# Patient Record
Sex: MALE | Race: WHITE | NOT HISPANIC OR LATINO | Employment: OTHER | URBAN - NONMETROPOLITAN AREA
[De-identification: names, ages, dates, MRNs, and addresses within clinical notes are randomized per-mention and may not be internally consistent; named-entity substitution may affect disease eponyms.]

---

## 2024-03-11 PROBLEM — R06.02 SOBOE (SHORTNESS OF BREATH ON EXERTION): Status: ACTIVE | Noted: 2024-03-11

## 2024-03-11 PROBLEM — I10 ESSENTIAL HYPERTENSION: Status: ACTIVE | Noted: 2024-03-11

## 2024-03-11 PROBLEM — R60.9 EDEMA: Status: ACTIVE | Noted: 2024-03-11

## 2024-03-11 PROBLEM — Z95.810 ICD (IMPLANTABLE CARDIOVERTER-DEFIBRILLATOR) IN PLACE: Status: ACTIVE | Noted: 2024-03-11

## 2024-03-11 PROBLEM — E11.9 DIABETES MELLITUS (MULTI): Status: ACTIVE | Noted: 2024-03-11

## 2024-03-11 PROBLEM — I42.8 NONISCHEMIC CARDIOMYOPATHY (MULTI): Status: ACTIVE | Noted: 2024-03-11

## 2024-03-11 PROBLEM — I50.22 CHRONIC SYSTOLIC HEART FAILURE, ACC/AHA STAGE C (MULTI): Status: ACTIVE | Noted: 2024-03-11

## 2024-03-11 RX ORDER — FUROSEMIDE 20 MG/1
1 TABLET ORAL DAILY
COMMUNITY

## 2024-03-11 RX ORDER — GLIPIZIDE 2.5 MG/1
2.5 TABLET, EXTENDED RELEASE ORAL DAILY
COMMUNITY

## 2024-03-11 RX ORDER — SPIRONOLACTONE 25 MG/1
1 TABLET ORAL 2 TIMES DAILY
COMMUNITY

## 2024-03-11 RX ORDER — CALCIUM CARBONATE 300MG(750)
400 TABLET,CHEWABLE ORAL DAILY
COMMUNITY

## 2024-03-11 RX ORDER — ALLOPURINOL 100 MG/1
100 TABLET ORAL 2 TIMES DAILY
COMMUNITY

## 2024-03-11 RX ORDER — FERROUS SULFATE 325(65) MG
325 TABLET ORAL
COMMUNITY

## 2024-03-11 RX ORDER — LOSARTAN POTASSIUM 25 MG/1
1 TABLET ORAL DAILY
COMMUNITY

## 2024-03-11 RX ORDER — COLCHICINE 0.6 MG/1
0.6 TABLET ORAL DAILY
COMMUNITY

## 2024-03-11 RX ORDER — CARVEDILOL 6.25 MG/1
1 TABLET ORAL
COMMUNITY

## 2024-03-11 RX ORDER — LINAGLIPTIN 5 MG/1
5 TABLET, FILM COATED ORAL DAILY
COMMUNITY

## 2024-06-28 ENCOUNTER — APPOINTMENT (OUTPATIENT)
Dept: CARDIOLOGY | Facility: CLINIC | Age: 79
End: 2024-06-28
Payer: MEDICARE

## 2024-06-28 VITALS
SYSTOLIC BLOOD PRESSURE: 110 MMHG | DIASTOLIC BLOOD PRESSURE: 70 MMHG | HEIGHT: 66 IN | WEIGHT: 200 LBS | HEART RATE: 62 BPM | BODY MASS INDEX: 32.14 KG/M2

## 2024-06-28 DIAGNOSIS — I42.8 NONISCHEMIC CARDIOMYOPATHY (MULTI): ICD-10-CM

## 2024-06-28 DIAGNOSIS — I10 ESSENTIAL HYPERTENSION: ICD-10-CM

## 2024-06-28 DIAGNOSIS — Z95.810 ICD (IMPLANTABLE CARDIOVERTER-DEFIBRILLATOR) IN PLACE: ICD-10-CM

## 2024-06-28 DIAGNOSIS — I50.22 CHRONIC SYSTOLIC HEART FAILURE, ACC/AHA STAGE C (MULTI): ICD-10-CM

## 2024-06-28 PROCEDURE — 1159F MED LIST DOCD IN RCRD: CPT | Performed by: INTERNAL MEDICINE

## 2024-06-28 PROCEDURE — 99214 OFFICE O/P EST MOD 30 MIN: CPT | Performed by: INTERNAL MEDICINE

## 2024-06-28 PROCEDURE — 1036F TOBACCO NON-USER: CPT | Performed by: INTERNAL MEDICINE

## 2024-06-28 PROCEDURE — 3078F DIAST BP <80 MM HG: CPT | Performed by: INTERNAL MEDICINE

## 2024-06-28 PROCEDURE — 3074F SYST BP LT 130 MM HG: CPT | Performed by: INTERNAL MEDICINE

## 2024-06-28 RX ORDER — PREDNISONE 10 MG/1
10 TABLET ORAL DAILY
COMMUNITY

## 2024-06-28 NOTE — LETTER
"June 28, 2024     Amarjit Pedersen DO  [address]  [facility]  [facility] [id number]    Patient: Jay Alanis Jr.   YOB: 1945   Date of Visit: 6/28/2024       Dear Dr. Amarjit Pedersen DO:    Thank you for referring Jay Alanis to me for evaluation. Below are my notes for this consultation.  If you have questions, please do not hesitate to call me. I look forward to following your patient along with you.       Sincerely,     Qamar Mckee MD      CC: No Recipients  ______________________________________________________________________________________    Subjective   Jay Alanis Jr. is a 79 y.o. male       Chief Complaint    Follow-up          HPI     Review of Systems   All other systems reviewed and are negative.     Patient returns in follow-up of problems as noted.  He is done well.  His symptoms are functional class I to functional class II and overall he feels well.  He denies any complaints.  He has no orthopnea PND or dyspnea at night.  Blood pressure is adequately controlled and he is on appropriate guideline directed therapy.  Defibrillator checks demonstrate satisfactory device performance and no underlying arrhythmias necessitating adjustments in therapy.  As before we did advocate the merits of diet and weight loss.      Vitals:    06/28/24 1428   BP: 110/70   BP Location: Left arm   Patient Position: Sitting   Pulse: 62   Weight: 90.7 kg (200 lb)   Height: 1.676 m (5' 6\")        Objective   Physical Exam  Constitutional:       Appearance: Normal appearance.   HENT:      Nose: Nose normal.   Neck:      Vascular: No carotid bruit.   Cardiovascular:      Rate and Rhythm: Normal rate.      Pulses: Normal pulses.      Heart sounds: Normal heart sounds.   Pulmonary:      Effort: Pulmonary effort is normal.   Abdominal:      General: Bowel sounds are normal.      Palpations: Abdomen is soft.   Musculoskeletal:         General: Normal range of motion.      " Cervical back: Normal range of motion.      Right lower leg: No edema.      Left lower leg: No edema.   Skin:     General: Skin is warm and dry.   Neurological:      General: No focal deficit present.      Mental Status: He is alert.   Psychiatric:         Mood and Affect: Mood normal.         Behavior: Behavior normal.         Thought Content: Thought content normal.         Judgment: Judgment normal.         Allergies  Sulfa (sulfonamide antibiotics)     Current Medications    Current Outpatient Medications:   •  allopurinol (Zyloprim) 100 mg tablet, Take 1 tablet (100 mg) by mouth 2 times a day., Disp: , Rfl:   •  carvedilol (Coreg) 6.25 mg tablet, Take 1 tablet (6.25 mg) by mouth 2 times daily (morning and late afternoon)., Disp: , Rfl:   •  ferrous sulfate, 325 mg ferrous sulfate, tablet, Take 1 tablet by mouth once daily with breakfast., Disp: , Rfl:   •  furosemide (Lasix) 20 mg tablet, Take 1 tablet (20 mg) by mouth 2 times daily (morning and late afternoon)., Disp: , Rfl:   •  glipiZIDE XL (Glucotrol XL) 2.5 mg 24 hr tablet, Take 1 tablet (2.5 mg) by mouth once daily. Do not crush, chew, or split., Disp: , Rfl:   •  linaGLIPtin (Tradjenta) 5 mg tablet, Take 1 tablet (5 mg) by mouth once daily., Disp: , Rfl:   •  losartan (Cozaar) 25 mg tablet, Take 1 tablet (25 mg) by mouth once daily., Disp: , Rfl:   •  magnesium oxide (Mag-Ox) 400 mg tablet, Take 1 tablet (400 mg) by mouth once daily., Disp: , Rfl:   •  predniSONE (Deltasone) 10 mg tablet, Take 1 tablet (10 mg) by mouth once daily., Disp: , Rfl:   •  sodium zirconium cyclosilicate (Lokelma) 10 gram packet, Take by mouth., Disp: , Rfl:   •  spironolactone (Aldactone) 25 mg tablet, Take 1 tablet (25 mg) by mouth 2 times a day., Disp: , Rfl:                  Pacemaker/Defibrillator follow up per routine     Assessment/Plan   1. Nonischemic cardiomyopathy (Multi)  Nonischemic in nature.  Coronary angiogram several years ago unremarkable.  Improved with  guideline directed therapy    2. ICD (implantable cardioverter-defibrillator) in place  To mitigate risk of sudden cardiac death due to cardiomyopathy and malignant ventricular arrhythmia    3. Chronic systolic heart failure, ACC/AHA stage C (Multi)  Now functional class I-II    4. Essential hypertension  Adequately controlled based upon review    5. BMI 32.0-32.9,adult  The merits of weight loss were advocated    Scribe Attestation  By signing my name below, IKlarissa LPN, Scribdarci   attest that this documentation has been prepared under the direction and in the presence of Qamar Mckee MD.     Provider Attestation - Scribe documentation    All medical record entries made by the Scribe were at my direction and personally dictated by me. I have reviewed the chart and agree that the record accurately reflects my personal performance of the history, physical exam, discussion and plan.

## 2024-06-28 NOTE — PROGRESS NOTES
"Subjective   Jay Alanis Jr. is a 79 y.o. male       Chief Complaint    Follow-up          HPI     Review of Systems   All other systems reviewed and are negative.     Patient returns in follow-up of problems as noted.  He is done well.  His symptoms are functional class I to functional class II and overall he feels well.  He denies any complaints.  He has no orthopnea PND or dyspnea at night.  Blood pressure is adequately controlled and he is on appropriate guideline directed therapy.  Defibrillator checks demonstrate satisfactory device performance and no underlying arrhythmias necessitating adjustments in therapy.  As before we did advocate the merits of diet and weight loss.      Vitals:    06/28/24 1428   BP: 110/70   BP Location: Left arm   Patient Position: Sitting   Pulse: 62   Weight: 90.7 kg (200 lb)   Height: 1.676 m (5' 6\")        Objective   Physical Exam  Constitutional:       Appearance: Normal appearance.   HENT:      Nose: Nose normal.   Neck:      Vascular: No carotid bruit.   Cardiovascular:      Rate and Rhythm: Normal rate.      Pulses: Normal pulses.      Heart sounds: Normal heart sounds.   Pulmonary:      Effort: Pulmonary effort is normal.   Abdominal:      General: Bowel sounds are normal.      Palpations: Abdomen is soft.   Musculoskeletal:         General: Normal range of motion.      Cervical back: Normal range of motion.      Right lower leg: No edema.      Left lower leg: No edema.   Skin:     General: Skin is warm and dry.   Neurological:      General: No focal deficit present.      Mental Status: He is alert.   Psychiatric:         Mood and Affect: Mood normal.         Behavior: Behavior normal.         Thought Content: Thought content normal.         Judgment: Judgment normal.         Allergies  Sulfa (sulfonamide antibiotics)     Current Medications    Current Outpatient Medications:     allopurinol (Zyloprim) 100 mg tablet, Take 1 tablet (100 mg) by mouth 2 times a day., Disp: " , Rfl:     carvedilol (Coreg) 6.25 mg tablet, Take 1 tablet (6.25 mg) by mouth 2 times daily (morning and late afternoon)., Disp: , Rfl:     ferrous sulfate, 325 mg ferrous sulfate, tablet, Take 1 tablet by mouth once daily with breakfast., Disp: , Rfl:     furosemide (Lasix) 20 mg tablet, Take 1 tablet (20 mg) by mouth 2 times daily (morning and late afternoon)., Disp: , Rfl:     glipiZIDE XL (Glucotrol XL) 2.5 mg 24 hr tablet, Take 1 tablet (2.5 mg) by mouth once daily. Do not crush, chew, or split., Disp: , Rfl:     linaGLIPtin (Tradjenta) 5 mg tablet, Take 1 tablet (5 mg) by mouth once daily., Disp: , Rfl:     losartan (Cozaar) 25 mg tablet, Take 1 tablet (25 mg) by mouth once daily., Disp: , Rfl:     magnesium oxide (Mag-Ox) 400 mg tablet, Take 1 tablet (400 mg) by mouth once daily., Disp: , Rfl:     predniSONE (Deltasone) 10 mg tablet, Take 1 tablet (10 mg) by mouth once daily., Disp: , Rfl:     sodium zirconium cyclosilicate (Lokelma) 10 gram packet, Take by mouth., Disp: , Rfl:     spironolactone (Aldactone) 25 mg tablet, Take 1 tablet (25 mg) by mouth 2 times a day., Disp: , Rfl:                  Pacemaker/Defibrillator follow up per routine     Assessment/Plan   1. Nonischemic cardiomyopathy (Multi)  Nonischemic in nature.  Coronary angiogram several years ago unremarkable.  Improved with guideline directed therapy    2. ICD (implantable cardioverter-defibrillator) in place  To mitigate risk of sudden cardiac death due to cardiomyopathy and malignant ventricular arrhythmia    3. Chronic systolic heart failure, ACC/AHA stage C (Multi)  Now functional class I-II    4. Essential hypertension  Adequately controlled based upon review    5. BMI 32.0-32.9,adult  The merits of weight loss were advocated    Scribe Attestation  By signing my name below, IKlarissa LPN   , Scribe   attest that this documentation has been prepared under the direction and in the presence of Qamar Mckee MD.     Provider  Attestation - Scribe documentation    All medical record entries made by the Scribe were at my direction and personally dictated by me. I have reviewed the chart and agree that the record accurately reflects my personal performance of the history, physical exam, discussion and plan.

## 2024-07-05 PROCEDURE — 93295 DEV INTERROG REMOTE 1/2/MLT: CPT | Performed by: INTERNAL MEDICINE

## 2024-08-05 DIAGNOSIS — I10 ESSENTIAL HYPERTENSION: ICD-10-CM

## 2024-08-05 DIAGNOSIS — R60.0 LOCALIZED EDEMA: ICD-10-CM

## 2024-08-05 DIAGNOSIS — I50.22 CHRONIC SYSTOLIC HEART FAILURE, ACC/AHA STAGE C (MULTI): ICD-10-CM

## 2024-08-05 DIAGNOSIS — I42.8 NONISCHEMIC CARDIOMYOPATHY (MULTI): ICD-10-CM

## 2024-08-07 RX ORDER — SPIRONOLACTONE 25 MG/1
25 TABLET ORAL 2 TIMES DAILY
Qty: 180 TABLET | Refills: 3 | Status: SHIPPED | OUTPATIENT
Start: 2024-08-07

## 2024-08-07 RX ORDER — CARVEDILOL 6.25 MG/1
6.25 TABLET ORAL
Qty: 180 TABLET | Refills: 3 | Status: SHIPPED | OUTPATIENT
Start: 2024-08-07

## 2024-08-07 RX ORDER — FUROSEMIDE 20 MG/1
20 TABLET ORAL 2 TIMES DAILY
Qty: 180 TABLET | Refills: 3 | Status: SHIPPED | OUTPATIENT
Start: 2024-08-07

## 2024-08-07 RX ORDER — LOSARTAN POTASSIUM 25 MG/1
25 TABLET ORAL DAILY
Qty: 90 TABLET | Refills: 3 | Status: SHIPPED | OUTPATIENT
Start: 2024-08-07

## 2024-09-16 ENCOUNTER — TELEPHONE (OUTPATIENT)
Dept: CARDIOLOGY | Facility: CLINIC | Age: 79
End: 2024-09-16
Payer: MEDICARE

## 2024-09-16 DIAGNOSIS — Z95.810 ICD (IMPLANTABLE CARDIOVERTER-DEFIBRILLATOR) IN PLACE: ICD-10-CM

## 2024-09-16 DIAGNOSIS — I42.8 NONISCHEMIC CARDIOMYOPATHY (MULTI): ICD-10-CM

## 2025-06-16 DIAGNOSIS — I50.22 CHRONIC SYSTOLIC HEART FAILURE, ACC/AHA STAGE C: ICD-10-CM

## 2025-06-16 DIAGNOSIS — I42.8 NONISCHEMIC CARDIOMYOPATHY (MULTI): ICD-10-CM

## 2025-06-16 DIAGNOSIS — I10 ESSENTIAL HYPERTENSION: ICD-10-CM

## 2025-06-16 DIAGNOSIS — R60.0 LOCALIZED EDEMA: ICD-10-CM

## 2025-06-17 RX ORDER — FUROSEMIDE 20 MG/1
20 TABLET ORAL 2 TIMES DAILY
Qty: 180 TABLET | Refills: 3 | Status: SHIPPED | OUTPATIENT
Start: 2025-06-17 | End: 2026-06-17

## 2025-06-17 RX ORDER — CARVEDILOL 6.25 MG/1
6.25 TABLET ORAL
Qty: 180 TABLET | Refills: 3 | Status: SHIPPED | OUTPATIENT
Start: 2025-06-17 | End: 2026-06-17

## 2025-06-17 RX ORDER — SPIRONOLACTONE 25 MG/1
25 TABLET ORAL 2 TIMES DAILY
Qty: 180 TABLET | Refills: 3 | Status: SHIPPED | OUTPATIENT
Start: 2025-06-17 | End: 2026-06-17

## 2025-06-17 RX ORDER — LOSARTAN POTASSIUM 25 MG/1
25 TABLET ORAL DAILY
Qty: 90 TABLET | Refills: 3 | Status: SHIPPED | OUTPATIENT
Start: 2025-06-17 | End: 2026-06-17

## 2025-06-27 ENCOUNTER — APPOINTMENT (OUTPATIENT)
Dept: CARDIOLOGY | Facility: CLINIC | Age: 80
End: 2025-06-27
Payer: MEDICARE

## 2025-06-27 VITALS
HEIGHT: 66 IN | WEIGHT: 194 LBS | DIASTOLIC BLOOD PRESSURE: 52 MMHG | SYSTOLIC BLOOD PRESSURE: 112 MMHG | BODY MASS INDEX: 31.18 KG/M2 | HEART RATE: 66 BPM

## 2025-06-27 DIAGNOSIS — Z87.891 FORMER SMOKER: ICD-10-CM

## 2025-06-27 DIAGNOSIS — I42.8 NONISCHEMIC CARDIOMYOPATHY (MULTI): Primary | ICD-10-CM

## 2025-06-27 DIAGNOSIS — R60.0 LOCALIZED EDEMA: ICD-10-CM

## 2025-06-27 DIAGNOSIS — E11.22 TYPE 2 DIABETES MELLITUS WITH STAGE 4 CHRONIC KIDNEY DISEASE, WITHOUT LONG-TERM CURRENT USE OF INSULIN (MULTI): ICD-10-CM

## 2025-06-27 DIAGNOSIS — Z95.810 ICD (IMPLANTABLE CARDIOVERTER-DEFIBRILLATOR) IN PLACE: ICD-10-CM

## 2025-06-27 DIAGNOSIS — E66.811 OBESITY, CLASS I, BMI 30-34.9: ICD-10-CM

## 2025-06-27 DIAGNOSIS — N18.4 TYPE 2 DIABETES MELLITUS WITH STAGE 4 CHRONIC KIDNEY DISEASE, WITHOUT LONG-TERM CURRENT USE OF INSULIN (MULTI): ICD-10-CM

## 2025-06-27 DIAGNOSIS — I10 ESSENTIAL HYPERTENSION: ICD-10-CM

## 2025-06-27 DIAGNOSIS — I50.22 CHRONIC SYSTOLIC HEART FAILURE, ACC/AHA STAGE C: ICD-10-CM

## 2025-06-27 DIAGNOSIS — N18.4 STAGE 4 CHRONIC KIDNEY DISEASE (MULTI): ICD-10-CM

## 2025-06-27 PROCEDURE — G2211 COMPLEX E/M VISIT ADD ON: HCPCS | Performed by: INTERNAL MEDICINE

## 2025-06-27 PROCEDURE — 99214 OFFICE O/P EST MOD 30 MIN: CPT | Performed by: INTERNAL MEDICINE

## 2025-06-27 PROCEDURE — 1036F TOBACCO NON-USER: CPT | Performed by: INTERNAL MEDICINE

## 2025-06-27 PROCEDURE — 3074F SYST BP LT 130 MM HG: CPT | Performed by: INTERNAL MEDICINE

## 2025-06-27 PROCEDURE — 1159F MED LIST DOCD IN RCRD: CPT | Performed by: INTERNAL MEDICINE

## 2025-06-27 PROCEDURE — 3078F DIAST BP <80 MM HG: CPT | Performed by: INTERNAL MEDICINE

## 2025-06-27 RX ORDER — SPIRONOLACTONE 25 MG/1
25 TABLET ORAL DAILY
Qty: 90 TABLET | Refills: 3 | Status: SHIPPED | OUTPATIENT
Start: 2025-06-27 | End: 2026-06-27

## 2025-06-27 RX ORDER — FUROSEMIDE 20 MG/1
20 TABLET ORAL
COMMUNITY

## 2025-06-27 NOTE — LETTER
June 27, 2025     Amarjit Pedersen DO  [address]  [facility]  [facility] [address]    Patient: Jay Alanis Jr.   YOB: 1945   Date of Visit: 6/27/2025       Dear Dr. Amarjit Pedersen DO:    Thank you for referring Jay Alanis to me for evaluation. Below are my notes for this consultation.  If you have questions, please do not hesitate to call me. I look forward to following your patient along with you.       Sincerely,     Roderick Burciaga MD      CC: No Recipients  ______________________________________________________________________________________    Chief Complaint   Patient presents with   • Follow-up     1 year follow up for nonischemic cardiomyopathy       Subjective   Jay Alanis Jr. is a 80 y.o. male     HPI   80-year-old white male who has previously followed with Dr. Mckee, in 2021 he was diagnosed with nonischemic cardiomyopathy based on cardiac catheterization, ejection fraction at the time was 30%.  He does have AICD in place.  There has been no functional assessment since that time.  He has been treated with carvedilol, loop diuretics, spironolactone and losartan.  He has had no defibrillator discharges and his last pacemaker check in April 2025 demonstrated no cardiac arrhythmias.  He has about 5 years left on the battery.  He has stage IV chronic kidney disease GFR about 20 mL/min based on his most recent lab data from April 2025 and continue to follow with nephrology.  His weight is above target BMI 31.3 kg/m².  He denies any orthopnea PND or lower extremity edema.  Has no dysuria or hematuria no hemoptysis or hematemesis.  He has no documented sleep apnea but he confesses that he snores at night.  He has no symptoms of fatigue or exercise intolerance.    Assessment/recommendations:    1-history of nonischemic cardiomyopathy diagnosed in 2021 with normal coronary angiogram at that time.  EF was 30% back then.  He has been treated with medical  "therapy that consisted of carvedilol, losartan, spironolactone and loop diuretics with furosemide.  There has been no functional study since to reassess ejection fraction now we will have to schedule an echocardiogram and the rationale was explained to the patient.  Patient understand and agree.  2-status post AICD for primary prevention purposes, last device check April 2025 demonstrated no activities with normal device function.  Will continue to monitor in the pacemaker clinic.  3-stage IV chronic kidney disease and GFR of 20 mL/min based on April 2025 data.  He follows with nephrology.  His medical regimen includes large amount of fluid diuretics and spironolactone along with losartan.  I advised patient that we need to reassess ejection fraction by echo, if it has normalized we can cut back significantly on his medication which could improve his renal function.  He will reduce spironolactone from 50 mg daily down to 25 mg daily.  4-history of hyperkalemia being treated by nephrology on potassium lowering agents.  If his ejection fraction is normal we can make a significant improvement of his kidney function by eliminating diuretics.  5-class I obesity, encouraged patient to maintain active lifestyle, cut back calorie consumption and stay physically active.  6-AICD for primary prevention purposes with no discharge and with normal device function as of April 2025.  Will continue to monitor in the pacemaker clinic  7-essential hypertension, currently under control on a combination therapy, reminded patient on the need for low calorie diet, regular exercise and achieving ideal body weight.  There is a question kristina on whether patient has sleep apnea or not but he presently has no symptoms of sleep apnea.  ROS         Vitals:    06/27/25 1345   BP: 112/52   BP Location: Left arm   Patient Position: Sitting   Pulse: 66   Weight: 88 kg (194 lb)   Height: 1.676 m (5' 6\")        Objective   Physical " Exam  Constitutional:       Appearance: Normal appearance.   HENT:      Nose: Nose normal.   Neck:      Vascular: No carotid bruit.   Cardiovascular:      Rate and Rhythm: Normal rate.      Pulses: Normal pulses.      Heart sounds: Normal heart sounds.   Pulmonary:      Effort: Pulmonary effort is normal.   Abdominal:      General: Bowel sounds are normal.      Palpations: Abdomen is soft.   Musculoskeletal:         General: Normal range of motion.      Cervical back: Normal range of motion.      Right lower leg: No edema.      Left lower leg: No edema.   Skin:     General: Skin is warm and dry.   Neurological:      General: No focal deficit present.      Mental Status: He is alert.   Psychiatric:         Mood and Affect: Mood normal.         Behavior: Behavior normal.         Thought Content: Thought content normal.         Judgment: Judgment normal.         Allergies  Sulfa (sulfonamide antibiotics)     Current Medications  Current Outpatient Medications   Medication Instructions   • allopurinol (ZYLOPRIM) 100 mg, 2 times daily   • carvedilol (COREG) 6.25 mg, oral, 2 times daily (morning and late afternoon)   • furosemide (LASIX) 20 mg, 2 times daily (morning and late afternoon)   • glipiZIDE XL (GLUCOTROL XL) 2.5 mg, Daily   • losartan (COZAAR) 25 mg, oral, Daily   • magnesium oxide (MAG-OX) 400 mg, Daily   • predniSONE (DELTASONE) 10 mg, Daily   • sodium zirconium cyclosilicate (Lokelma) 10 gram packet Take by mouth.   • spironolactone (ALDACTONE) 25 mg, oral, 2 times daily   • Tradjenta 5 mg, Daily                        Assessment/Plan   1. Nonischemic cardiomyopathy (Multi)  Follow Up In Cardiology      2. Chronic systolic heart failure, ACC/AHA stage C        3. ICD (implantable cardioverter-defibrillator) in place        4. Essential hypertension        5. Type 2 diabetes mellitus with stage 4 chronic kidney disease, without long-term current use of insulin (Multi)        6. Stage 4 chronic kidney disease  (Multi)        7. BMI 31.0-31.9,adult        8. Former smoker                 Scribe Attestation  By signing my name below, I, Summer PACHECO LPN  , Scribe   attest that this documentation has been prepared under the direction and in the presence of Roderick Burciaga MD.     Provider Attestation - Scribe documentation    All medical record entries made by the Scribe were at my direction and personally dictated by me. I have reviewed the chart and agree that the record accurately reflects my personal performance of the history, physical exam, discussion and plan.

## 2025-06-27 NOTE — PROGRESS NOTES
Chief Complaint   Patient presents with    Follow-up     1 year follow up for nonischemic cardiomyopathy       Subjective   Jay Alanis Jr. is a 80 y.o. male     HPI   80-year-old white male who has previously followed with Dr. Mckee, in 2021 he was diagnosed with nonischemic cardiomyopathy based on cardiac catheterization, ejection fraction at the time was 30%.  He does have AICD in place.  There has been no functional assessment since that time.  He has been treated with carvedilol, loop diuretics, spironolactone and losartan.  He has had no defibrillator discharges and his last pacemaker check in April 2025 demonstrated no cardiac arrhythmias.  He has about 5 years left on the battery.  He has stage IV chronic kidney disease GFR about 20 mL/min based on his most recent lab data from April 2025 and continue to follow with nephrology.  His weight is above target BMI 31.3 kg/m².  He denies any orthopnea PND or lower extremity edema.  Has no dysuria or hematuria no hemoptysis or hematemesis.  He has no documented sleep apnea but he confesses that he snores at night.  He has no symptoms of fatigue or exercise intolerance.    Assessment/recommendations:    1-history of nonischemic cardiomyopathy diagnosed in 2021 with normal coronary angiogram at that time.  EF was 30% back then.  He has been treated with medical therapy that consisted of carvedilol, losartan, spironolactone and loop diuretics with furosemide.  There has been no functional study since to reassess ejection fraction now we will have to schedule an echocardiogram and the rationale was explained to the patient.  Patient understand and agree.  2-status post AICD for primary prevention purposes, last device check April 2025 demonstrated no activities with normal device function.  Will continue to monitor in the pacemaker clinic.  3-stage IV chronic kidney disease and GFR of 20 mL/min based on April 2025 data.  He follows with nephrology.  His medical  "regimen includes large amount of fluid diuretics and spironolactone along with losartan.  I advised patient that we need to reassess ejection fraction by echo, if it has normalized we can cut back significantly on his medication which could improve his renal function.  He will reduce spironolactone from 50 mg daily down to 25 mg daily.  4-history of hyperkalemia being treated by nephrology on potassium lowering agents.  If his ejection fraction is normal we can make a significant improvement of his kidney function by eliminating diuretics.  5-class I obesity, encouraged patient to maintain active lifestyle, cut back calorie consumption and stay physically active.  6-AICD for primary prevention purposes with no discharge and with normal device function as of April 2025.  Will continue to monitor in the pacemaker clinic  7-essential hypertension, currently under control on a combination therapy, reminded patient on the need for low calorie diet, regular exercise and achieving ideal body weight.  There is a question kristina on whether patient has sleep apnea or not but he presently has no symptoms of sleep apnea.  ROS         Vitals:    06/27/25 1345   BP: 112/52   BP Location: Left arm   Patient Position: Sitting   Pulse: 66   Weight: 88 kg (194 lb)   Height: 1.676 m (5' 6\")        Objective   Physical Exam  Constitutional:       Appearance: Normal appearance.   HENT:      Nose: Nose normal.   Neck:      Vascular: No carotid bruit.   Cardiovascular:      Rate and Rhythm: Normal rate.      Pulses: Normal pulses.      Heart sounds: Normal heart sounds.   Pulmonary:      Effort: Pulmonary effort is normal.   Abdominal:      General: Bowel sounds are normal.      Palpations: Abdomen is soft.   Musculoskeletal:         General: Normal range of motion.      Cervical back: Normal range of motion.      Right lower leg: No edema.      Left lower leg: No edema.   Skin:     General: Skin is warm and dry.   Neurological:      " General: No focal deficit present.      Mental Status: He is alert.   Psychiatric:         Mood and Affect: Mood normal.         Behavior: Behavior normal.         Thought Content: Thought content normal.         Judgment: Judgment normal.         Allergies  Sulfa (sulfonamide antibiotics)     Current Medications  Current Outpatient Medications   Medication Instructions    allopurinol (ZYLOPRIM) 100 mg, 2 times daily    carvedilol (COREG) 6.25 mg, oral, 2 times daily (morning and late afternoon)    furosemide (LASIX) 20 mg, 2 times daily (morning and late afternoon)    glipiZIDE XL (GLUCOTROL XL) 2.5 mg, Daily    losartan (COZAAR) 25 mg, oral, Daily    magnesium oxide (MAG-OX) 400 mg, Daily    predniSONE (DELTASONE) 10 mg, Daily    sodium zirconium cyclosilicate (Lokelma) 10 gram packet Take by mouth.    spironolactone (ALDACTONE) 25 mg, oral, 2 times daily    Tradjenta 5 mg, Daily                        Assessment/Plan   1. Nonischemic cardiomyopathy (Multi)  Follow Up In Cardiology      2. Chronic systolic heart failure, ACC/AHA stage C        3. ICD (implantable cardioverter-defibrillator) in place        4. Essential hypertension        5. Type 2 diabetes mellitus with stage 4 chronic kidney disease, without long-term current use of insulin (Multi)        6. Stage 4 chronic kidney disease (Multi)        7. BMI 31.0-31.9,adult        8. Former smoker                 Scribe Attestation  By signing my name below, I, Rosamaria Verma LPN   attest that this documentation has been prepared under the direction and in the presence of Roderick Burciaga MD.     Provider Attestation - Scribe documentation    All medical record entries made by the Scribe were at my direction and personally dictated by me. I have reviewed the chart and agree that the record accurately reflects my personal performance of the history, physical exam, discussion and plan.

## 2025-06-27 NOTE — PATIENT INSTRUCTIONS
Please bring all medicines, vitamins, and herbal supplements with you when you come to the office.    Prescriptions will not be filled unless you are compliant with your follow up appointments or have a follow up appointment scheduled as per instruction of your physician. Refills should be requested at the time of your visit.     Pacemaker/Defibrillator follow up per routine     BMI was above normal measurement. Current weight: 88 kg (194 lb)  Weight change since last visit (-) denotes wt loss -6 lbs   Weight loss needed to achieve BMI 25: 39.4 Lbs  Weight loss needed to achieve BMI 30: 8.5 Lbs  Provided instructions on dietary changes.

## 2026-02-11 ENCOUNTER — APPOINTMENT (OUTPATIENT)
Dept: CARDIOLOGY | Facility: CLINIC | Age: 81
End: 2026-02-11
Payer: MEDICARE